# Patient Record
Sex: MALE | Race: WHITE | NOT HISPANIC OR LATINO | Employment: FULL TIME | URBAN - NONMETROPOLITAN AREA
[De-identification: names, ages, dates, MRNs, and addresses within clinical notes are randomized per-mention and may not be internally consistent; named-entity substitution may affect disease eponyms.]

---

## 2019-03-18 ENCOUNTER — OFFICE VISIT (OUTPATIENT)
Dept: ORTHOPEDIC SURGERY | Facility: CLINIC | Age: 59
End: 2019-03-18

## 2019-03-18 VITALS — WEIGHT: 140 LBS | HEIGHT: 74 IN | BODY MASS INDEX: 17.97 KG/M2

## 2019-03-18 DIAGNOSIS — S92.412A CLOSED DISPLACED FRACTURE OF PROXIMAL PHALANX OF LEFT GREAT TOE, INITIAL ENCOUNTER: Primary | ICD-10-CM

## 2019-03-18 PROBLEM — S92.401A: Status: ACTIVE | Noted: 2019-03-18

## 2019-03-18 PROCEDURE — 73630 X-RAY EXAM OF FOOT: CPT | Performed by: PHYSICIAN ASSISTANT

## 2019-03-18 PROCEDURE — 99203 OFFICE O/P NEW LOW 30 MIN: CPT | Performed by: PHYSICIAN ASSISTANT

## 2019-03-18 RX ORDER — ASPIRIN 81 MG/1
81 TABLET, CHEWABLE ORAL DAILY
COMMUNITY

## 2019-03-18 NOTE — PROGRESS NOTES
NEW/FOLLOW UP VISIT    Mac Renteria  ?  1960  ?  Chief Complaint   Patient presents with   • Left Foot - Work Related Injury, Pain      ?  HPI:  Patient presents today for follow-up on left foot pain from injury on 3/6/2019.  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  States while working, he dropped a 16' by 17' board on the distal portion of his left foot. Most of his pain is felt in the left great toe.  He waited until 3/13/2019 to be seek medical attention, at which point he decided to get care because it was not improving.  He was placed in a walking boot and has been off of work since 3/13/2019. He was referred to our office per his request.  His pain is a constant ache that is a 5/10 at this time, but increases to a 10/10 at the worst.  He is using an over-the-counter Tylenol muscle and pain which does relieve his pain but the symptoms quickly return.  His pain is increased when walking or being up on his feet.  He does have associated swelling but denies any numbness or tingling.    This patient is a new patient.  This problem is new to this examiner.    Social History     Socioeconomic History   • Marital status:      Spouse name: Not on file   • Number of children: Not on file   • Years of education: Not on file   • Highest education level: Not on file   Tobacco Use   • Smoking status: Never Smoker   • Smokeless tobacco: Never Used   Substance and Sexual Activity   • Alcohol use: Defer       Past Medical History:   Diagnosis Date   • CVA (cerebral vascular accident) (CMS/HCC)    • DVT, lower extremity (CMS/[facility])    • Skin cancer        No past surgical history on file.   No family history on file.  Allergies   Allergen Reactions   • Contrast Dye Unknown (See Comments)     unknown         Review of Systems   Constitutional: Negative.  Negative for fever.   Eyes: Negative.    Respiratory: Negative.    Cardiovascular: Negative.    Endocrine: Negative.    Musculoskeletal: Positive  for arthralgias, gait problem and joint swelling.   Skin: Negative.  Negative for rash and wound.   Allergic/Immunologic: Negative.    Neurological: Negative for numbness.   Hematological: Negative.    Psychiatric/Behavioral: Negative.            Physical Exam   Constitutional: Patient is oriented to person, place, and time. Appears well-developed and well-nourished.   HENT:   Head: Normocephalic and atraumatic.   Eyes: Conjunctivae and EOM are normal. Pupils are equal, round, and reactive to light.   Cardiovascular: Normal rate, regular rhythm, normal heart sounds and intact distal pulses.   Pulmonary/Chest: Effort normal and breath sounds normal.   Musculoskeletal:   See detailed exam below   Neurological: Alert and oriented to person, place, and time. No sensory deficit. Coordination normal.   Skin: Skin is warm and dry. Capillary refill takes less than 2 seconds. No rash noted. No erythema.   Psychiatric: Patient has a normal mood and affect. Her behavior is normal. Judgment and thought content normal.   Nursing note and vitals reviewed.    Ortho Exam:   Left foot-1st phalanx fx. The foot is swollen and tender. Lisfranc joint is stable. There is exquisite tenderness along the proximal phalanx. Appropriate amounts of swelling and bruising are noted. There is no evidence of a compartmental syndrome or instability of the midfoot. Dorsalis pedis and posterior tibial artery pulses are palpable. Common peroneal nerve function is well preserved. Dorsiflexion and plantarflexion are within normal limits although there is pain with movement due to the swelling in the left great toe.  The arches of the foot are fairly well preserved. Patient's gait is altered due to pain and difficulty walking/bearing weight on the affected area. The ankle mortise is stable.       Diagnostics:  X-Ray Report:  Left foot X-Ray  Indication: Evaluation of left foot pain  AP, Lateral views  Findings: Minimally angulated fracture of great toe  proximal phalanx.  Significantly improved in comparison to x-rays from 3/13/2019 taken at [facility], which showed more of a displaced and angulated fracture of the great toe proximal phalanx  Bony lesion: no  Soft tissues: Increased/swelling  Joint spaces: within normal limits  Hardware appropriately positioned: not applicable    Prior studies available for comparison: yes from outside clinic taken on 3/13/2019 of which were independently visualized and interpreted by myself    X-RAY was ordered and reviewed by Luis Stoddard PA-C       Assessment:  Mac was seen today for work related injury and pain.    Diagnoses and all orders for this visit:    Closed displaced fracture of proximal phalanx of left great toe, initial encounter  -     XR Foot 3+ View Left  -     HYDROcodone-acetaminophen (NORCO) 5-325 MG per tablet; Take 1 tablet by mouth Every 6 (Six) Hours As Needed for Moderate Pain  or Severe Pain .        ?  ?  Plan    · Continue with walking boot  · Weight bearing as tolerated although I would like him to elevate the foot as much as possible and to stay off of it as much as he can.  · Rest, ice, compression, and elevation (RICE) therapy  · Will discuss pain medication with Dr. Roman  · Follow up in 4 week(s)  · To remain off work until next follow-up      Luis Stoddard PA-C  03/18/2019

## 2019-03-19 RX ORDER — HYDROCODONE BITARTRATE AND ACETAMINOPHEN 5; 325 MG/1; MG/1
1 TABLET ORAL EVERY 6 HOURS PRN
Qty: 30 TABLET | Refills: 0 | Status: SHIPPED | OUTPATIENT
Start: 2019-03-19 | End: 2020-05-15 | Stop reason: SDUPTHER

## 2019-04-15 ENCOUNTER — HOSPITAL ENCOUNTER (OUTPATIENT)
Dept: NEUROLOGY | Facility: HOSPITAL | Age: 59
Discharge: HOME | End: 2019-04-15
Attending: PSYCHIATRY & NEUROLOGY

## 2019-04-15 LAB
CREAT BLD-MCNC: 1 MG/DL (ref 0.6–1.4)
GFR SERPLBLD BASED ON 1.73 SQ M-ARVRAT: >60 ML/MIN/{1.73_M2}

## 2019-04-16 ENCOUNTER — OFFICE VISIT (OUTPATIENT)
Dept: ORTHOPEDIC SURGERY | Facility: CLINIC | Age: 59
End: 2019-04-16

## 2019-04-16 VITALS — HEIGHT: 73 IN | TEMPERATURE: 97.8 F | BODY MASS INDEX: 20.67 KG/M2 | WEIGHT: 156 LBS

## 2019-04-16 DIAGNOSIS — S92.402D: Primary | ICD-10-CM

## 2019-04-16 PROCEDURE — 99024 POSTOP FOLLOW-UP VISIT: CPT | Performed by: PHYSICIAN ASSISTANT

## 2019-04-16 PROCEDURE — 73630 X-RAY EXAM OF FOOT: CPT | Performed by: PHYSICIAN ASSISTANT

## 2019-04-16 NOTE — PROGRESS NOTES
FRACTURE GLOBAL     NAME: Mac Renteria  ?  : 1960  ?  MRN: [id number]    Chief Complaint   Patient presents with   • Left Foot - Follow-up     ?  Date of fracture: 2019    HPI:   Patient returns today for 4 week(s) follow up of proximal phalanx of left great toe fracture that occurred while at work. Patient reports doing well with no unusual complaints. Appears to be progressing appropriately. He denies any pain but reports he cannot bend it well at night time due to the swelling. He has been wearing the boot and weight bearing as tolerated. He is currently off work due to the injury.      Review of Systems:      Ortho Exam:   Left foot-1st phalanx fx. The left great toe is minimally swollen and tender. Lisfranc joint is stable.  Bruising noted on initial exam has resolved. There is no evidence of a compartmental syndrome or instability of the midfoot. Dorsalis pedis and posterior tibial artery pulses are palpable. Common peroneal nerve function is well preserved. Dorsiflexion and plantarflexion are within normal limits although there is slight discomfort with movement due to the swelling in the left great toe.  The arches of the foot are fairly well preserved. Patient's gait is altered due to pain and difficulty walking/bearing weight on the affected area. The ankle mortise is stable.     Diagnostic Studies:  X-Ray Report:  Left foot X-Ray  Indication: Evaluation of healing of proximal phalanx of left great toe fracture  AP, Lateral views  Findings: Acceptable alignment of fracture of proximal phalanx of left great toe.   Bony lesion: no  Soft tissues: within normal limits  Joint spaces: within normal limits  Hardware appropriately positioned: not applicable    Prior studies available for comparison: yes from 3/18/2019    X-RAY was ordered and reviewed by Luis Stoddard PA-C        Assessment:  Mac was seen today for follow-up.    Diagnoses and all orders for this visit:    Closed displaced  fracture of phalanx of left great toe with routine healing, subsequent encounter  -     XR Foot 3+ View Left          Plan   Continue to wear cam walking boot for 1.5-2 more weeks at which point he can discontinue the boot and weight-bear as tolerated  Continue ice as needed  Stretching and strengthening exercises  Tylenol 1000mg by mouth every 4-6 hours as needed for pain   Fall precautions  Follow up in 4 week(s) with repeat x-rays  He is to remain off work until reevaluation at next follow-up      Luis Stoddard PA-C  5/14/2019

## 2019-05-14 ENCOUNTER — OFFICE VISIT (OUTPATIENT)
Dept: ORTHOPEDIC SURGERY | Facility: CLINIC | Age: 59
End: 2019-05-14

## 2019-05-14 VITALS — BODY MASS INDEX: 21.2 KG/M2 | HEIGHT: 73 IN | TEMPERATURE: 97.7 F | WEIGHT: 160 LBS

## 2019-05-14 DIAGNOSIS — S92.402D DISPLACED UNSPECIFIED FRACTURE OF LEFT GREAT TOE, SUBSEQUENT ENCOUNTER FOR FRACTURE WITH ROUTINE HEALING: Primary | ICD-10-CM

## 2019-05-14 PROCEDURE — 73660 X-RAY EXAM OF TOE(S): CPT | Performed by: PHYSICIAN ASSISTANT

## 2019-05-14 PROCEDURE — 99024 POSTOP FOLLOW-UP VISIT: CPT | Performed by: PHYSICIAN ASSISTANT

## 2019-05-14 NOTE — PROGRESS NOTES
OTHER POST OP GLOBAL     NAME: Mac Renteria  ?  : 1960  ?  MRN: [id number]    Chief Complaint   Patient presents with   • Left Foot - Follow-up     ?  Date of fracture: 3/6/2019  Date of initial visit: 3/18/2019    HPI:   Patient returns today for 10 week(s) follow up of left Proximal phalanx of left great toe fracture. Patient reports doing well with no unusual complaints. Appears to be progressing appropriately.  He reports he does not have any pain in his mobility is steadily increasing with time.  He is currently increasing his ability to weight-bear.  He would like to be able to return to work next week.  He has not yet started wearing his boots to see how his foot will do once he is back to work.  He has been able to walk around barefoot at home without any pain.      Review of Systems:      Ortho Exam:   Left foot-1st phalanx fx. The left great toe is minimally swollen and tender. Lisfranc joint is stable.  Bruising noted on initial exam has resolved. There is no evidence of a compartmental syndrome or instability of the midfoot. Dorsalis pedis and posterior tibial artery pulses are palpable. Common peroneal nerve function is well preserved. Dorsiflexion and plantarflexion are within normal limits although there is slight discomfort with movement due to the swelling in the left great toe.  The arches of the foot are fairly well preserved. Patient's gait is altered due to pain and difficulty walking/bearing weight on the affected area. The ankle mortise is stable.          Diagnostic Studies:  X-Ray Report:  Left foot X-Ray  Indication: Evaluation of healing of proximal phalanx of left great toe fracture  AP, Lateral views  Findings: Acceptable alignment of fracture of proximal phalanx of left great toe  Bony lesion: no  Soft tissues: within normal limits  Joint spaces: within normal limits  Hardware appropriately positioned: not applicable    Prior studies available for comparison: yes     X-RAY was  ordered and reviewed by Luis Stoddard PA-C        Assessment:  Mac was seen today for follow-up.    Diagnoses and all orders for this visit:    Displaced unspecified fracture of left great toe, subsequent encounter for fracture with routine healing  -     XR Toe 2+ View Left    Other orders  -     Cancel: XR Toe 2+ View Right          Plan   Incision care  Continue ice as needed  Advised to take the next week to completely discontinue the tall cam walking boot and begin to wear work boots to see how his pain is  Stretching and strengthening exercises  Alternate Ibuprofen and Tylenol as needed  Fall precautions  Follow up in 4 weeks   Patient was advised to call if any problems between now and return to work date but at this time he is able to return to work without restrictions on 5/22/2019    Luis Stoddard PA-C  05/14/2019

## 2019-06-11 ENCOUNTER — OFFICE VISIT (OUTPATIENT)
Dept: ORTHOPEDIC SURGERY | Facility: CLINIC | Age: 59
End: 2019-06-11

## 2019-06-11 DIAGNOSIS — S92.402D: Primary | ICD-10-CM

## 2019-06-11 PROCEDURE — 73660 X-RAY EXAM OF TOE(S): CPT | Performed by: PHYSICIAN ASSISTANT

## 2019-06-11 PROCEDURE — 99213 OFFICE O/P EST LOW 20 MIN: CPT | Performed by: PHYSICIAN ASSISTANT

## 2019-06-11 NOTE — PROGRESS NOTES
FOLLOW UP VISIT    Patient: Mac Renteria  ?  YOB: 1960    MRN: [id number]  ?  Chief Complaint   Patient presents with   • Left Foot - Follow-up     Date of fracture: 3/6/2019  Date of initial visit: 3/18/2019  ?  HPI:   Patient returns today for 13-week follow-up of left proximal phalanx of left great toe fracture.  Patient reports he is returned back to work and is doing well with no complaints.  In the first couple of days free starting work he did have some pain in the toe but that quickly resolved.  He is now able to wear his work boots without any problem.    This patient is an established patient.  This problem is not new to this examiner.      Allergies:   Allergies   Allergen Reactions   • Contrast Dye Unknown (See Comments)     unknown       Medications:   Home Medications:  Current Outpatient Medications on File Prior to Visit   Medication Sig   • aspirin 81 MG chewable tablet Chew 81 mg Daily.   • HYDROcodone-acetaminophen (NORCO) 5-325 MG per tablet Take 1 tablet by mouth Every 6 (Six) Hours As Needed for Moderate Pain  or Severe Pain .     No current facility-administered medications on file prior to visit.      Current Medications:  Scheduled Meds:  PRN Meds:.    I have reviewed the patient's medical history in detail and updated the computerized patient record.  Review and summarization of old records include:    Past Medical History:   Diagnosis Date   • CVA (cerebral vascular accident) (CMS/HCC)    • DVT, lower extremity (CMS/HCC)    • Skin cancer      No past surgical history on file.  Social History     Occupational History   • Not on file   Tobacco Use   • Smoking status: Never Smoker   • Smokeless tobacco: Never Used   Substance and Sexual Activity   • Alcohol use: Defer   • Drug use: Not on file   • Sexual activity: Not on file      Social History     Social History Narrative   • Not on file     No family history on file.      Review of Systems   Constitutional: Negative.   "Negative for fever.   Eyes: Negative.    Respiratory: Negative.    Cardiovascular: Negative.    Endocrine: Negative.    Musculoskeletal: Negative for arthralgias, gait problem and joint swelling.   Skin: Negative.  Negative for rash and wound.   Allergic/Immunologic: Negative.    Neurological: Negative for numbness.   Hematological: Negative.    Psychiatric/Behavioral: Negative.           Wt Readings from Last 3 Encounters:   05/14/19 72.6 kg (160 lb)   04/16/19 70.8 kg (156 lb)   03/18/19 63.5 kg (140 lb)     Ht Readings from Last 3 Encounters:   05/14/19 186.1 cm (73.25\")   04/16/19 185.4 cm (73\")   03/18/19 188 cm (74\")     There is no height or weight on file to calculate BMI.  No height and weight on file for this encounter.  There were no vitals filed for this visit.      Physical Exam   Constitutional: Patient is oriented to person, place, and time. Appears well-developed and well-nourished.   HENT:   Head: Normocephalic and atraumatic.   Eyes: Conjunctivae and EOM are normal. Pupils are equal, round, and reactive to light.   Cardiovascular: Normal rate, regular rhythm, normal heart sounds and intact distal pulses.   Pulmonary/Chest: Effort normal and breath sounds normal.   Musculoskeletal:   See detailed exam below   Neurological: Alert and oriented to person, place, and time. No sensory deficit. Coordination normal.   Skin: Skin is warm and dry. Capillary refill takes less than 2 seconds. No rash noted. No erythema.   Psychiatric: Patient has a normal mood and affect. Her behavior is normal. Judgment and thought content normal.   Nursing note and vitals reviewed.    Ortho Exam:   Left foot-1st phalanx fx. There is no swelling of the left great toe. Lisfranc joint is stable.  Bruising noted on initial exam has resolved. There is no evidence of a compartmental syndrome or instability of the midfoot. Dorsalis pedis and posterior tibial artery pulses are palpable. Common peroneal nerve function is well " preserved. Dorsiflexion and plantarflexion are within normal limits. The arches of the foot are fairly well preserved. Patient's gait is normal. The ankle mortise is stable.       Diagnostics:  X-Ray Report:  Left great toe x-Ray  Indication: Evaluation of healing of proximal phalanx of left great toe fracture  AP, Lateral views  Findings: Acceptable alignment and healing of fracture of proximal phalanx  Bony lesion: no  Soft tissues: within normal limits  Joint spaces: within normal limits  Hardware appropriately positioned: not applicable    Prior studies available for comparison: yes, most recent from 5/14/2019    X-RAY was ordered and reviewed by Luis Stoddard PA-C       Assessment:  Mac was seen today for follow-up.    Diagnoses and all orders for this visit:    Closed displaced fracture of phalanx of left great toe with routine healing, subsequent encounter  -     XR Toe 2+ View Left    ?    Plan    · Released to return to work without any restrictions  · Rest, ice, compression, and elevation (RICE) therapy  · Stretching and strengthening exercises  · OTC Alternate Ibuprofen and Tylenol as needed  · Follow up as needed    Luis Stoddard PA-C  06/11/2019

## 2019-06-27 ENCOUNTER — HOSPITAL ENCOUNTER (OUTPATIENT)
Dept: OTHER | Facility: HOSPITAL | Age: 59
Discharge: HOME | End: 2019-06-27
Attending: INTERNAL MEDICINE

## 2019-06-27 LAB — ERYTHROCYTE [SEDIMENTATION RATE] IN BLOOD: 2 MM/H (ref 0–20)

## 2019-06-30 LAB
C3 SERPL-MCNC: 103 MG/DL (ref 88–201)
C4 SERPL-MCNC: 38 MG/DL (ref 10–40)
CONV ANA IGG BY ELISA: NORMAL
RHEUMATOID FACT SERPL-ACNC: <10 IU/ML (ref 0–14)

## 2020-05-15 ENCOUNTER — OFFICE VISIT (OUTPATIENT)
Dept: ORTHOPEDIC SURGERY | Facility: CLINIC | Age: 60
End: 2020-05-15

## 2020-05-15 VITALS — TEMPERATURE: 98.7 F

## 2020-05-15 DIAGNOSIS — S82.025A CLOSED NONDISPLACED LONGITUDINAL FRACTURE OF LEFT PATELLA, INITIAL ENCOUNTER: Primary | ICD-10-CM

## 2020-05-15 DIAGNOSIS — S89.92XA INJURY OF LEFT KNEE, INITIAL ENCOUNTER: ICD-10-CM

## 2020-05-15 DIAGNOSIS — S92.412A CLOSED DISPLACED FRACTURE OF PROXIMAL PHALANX OF LEFT GREAT TOE, INITIAL ENCOUNTER: ICD-10-CM

## 2020-05-15 PROCEDURE — 99214 OFFICE O/P EST MOD 30 MIN: CPT | Performed by: PHYSICIAN ASSISTANT

## 2020-05-15 PROCEDURE — 27520 TREAT KNEECAP FRACTURE: CPT | Performed by: PHYSICIAN ASSISTANT

## 2020-05-15 PROCEDURE — 73562 X-RAY EXAM OF KNEE 3: CPT | Performed by: PHYSICIAN ASSISTANT

## 2020-05-16 RX ORDER — HYDROCODONE BITARTRATE AND ACETAMINOPHEN 5; 325 MG/1; MG/1
1 TABLET ORAL EVERY 6 HOURS PRN
Qty: 30 TABLET | Refills: 0 | Status: SHIPPED | OUTPATIENT
Start: 2020-05-16 | End: 2020-05-18 | Stop reason: SDUPTHER

## 2020-05-18 DIAGNOSIS — S92.412A CLOSED DISPLACED FRACTURE OF PROXIMAL PHALANX OF LEFT GREAT TOE, INITIAL ENCOUNTER: ICD-10-CM

## 2020-05-18 RX ORDER — HYDROCODONE BITARTRATE AND ACETAMINOPHEN 5; 325 MG/1; MG/1
1 TABLET ORAL EVERY 6 HOURS PRN
Qty: 30 TABLET | Refills: 0 | Status: SHIPPED | OUTPATIENT
Start: 2020-05-18 | End: 2020-08-07

## 2020-05-18 NOTE — TELEPHONE ENCOUNTER
I TRIED TO CALL THE PT AND LET HIM KNOW HIS RX WAS SENT TO THE PHARMACY BUT SAYS ITS NOT A WORKING NUMBER

## 2020-05-20 PROBLEM — S82.025A CLOSED NONDISPLACED LONGITUDINAL FRACTURE OF LEFT PATELLA: Status: ACTIVE | Noted: 2020-05-20

## 2020-05-20 PROBLEM — S89.92XA INJURY OF LEFT KNEE: Status: ACTIVE | Noted: 2020-05-20

## 2020-05-20 NOTE — PROGRESS NOTES
NEW VISIT    Patient: Mac Renteria  ?  YOB: 1960    MRN: [id number]  ?  Chief Complaint   Patient presents with   • Left Knee - Pain, Injury      ?  HPI:   Mac Renteria is a 60 y.o. male who presents with complaint of left knee pain secondary to a fall at work.  He works at [employer] in [place] and reports he fell directly onto the left knee.  The following day the pain was significantly worse.  He states the pain is worse at night and he is using a walker for ambulation assistance.  He does have a work comp  but is not accompanied by a  today.  Pain Location: left knee  Radiation: none  Quality: aching  Intensity/Severity: moderate  Duration: since 4/22/20  Onset quality: sudden  Timing: intermittent  Aggravating Factors: walking  Alleviating Factors: rest  Previous Episodes: none mentioned  Associated Symptoms: pain, swelling, clicking/popping and decreased range of motion  ADLs Affected: grooming/hygiene/toileting/personal care, dressing, ambulating, work related activities, recreational activities/sports  Previous Treatment: He was [facility] at [facility] in the emergency room followed by work comp provider.  X-rays and MRI have been obtained.    This patient is an established patient.  This problem is new to this examiner.      Allergies:   Allergies   Allergen Reactions   • Contrast Dye Unknown (See Comments)     unknown       Medications:   Home Medications:  Current Outpatient Medications on File Prior to Visit   Medication Sig   • aspirin 81 MG chewable tablet Chew 81 mg Daily.     No current facility-administered medications on file prior to visit.      Current Medications:  Scheduled Meds:  PRN Meds:.    I have reviewed the patient's medical history in detail and updated the computerized patient record.  Review and summarization of old records include:    Past Medical History:   Diagnosis Date   • CVA (cerebral vascular accident) (CMS/[facility])    • DVT,  "lower extremity (CMS/HCC)    • Skin cancer      No past surgical history on file.  Social History     Occupational History   • Not on file   Tobacco Use   • Smoking status: Never Smoker   • Smokeless tobacco: Never Used   Substance and Sexual Activity   • Alcohol use: Defer   • Drug use: Not on file   • Sexual activity: Not on file     No family history on file.      Review of Systems   Constitutional: Negative.  Negative for fever.   Eyes: Negative.    Respiratory: Negative.    Cardiovascular: Negative.    Endocrine: Negative.    Musculoskeletal: Positive for arthralgias, gait problem and joint swelling.   Skin: Negative.  Negative for rash and wound.   Allergic/Immunologic: Negative.    Neurological: Negative for numbness.   Hematological: Negative.    Psychiatric/Behavioral: Negative.           Wt Readings from Last 3 Encounters:   05/14/19 72.6 kg (160 lb)   04/16/19 70.8 kg (156 lb)   03/18/19 63.5 kg (140 lb)     Ht Readings from Last 3 Encounters:   05/14/19 186.1 cm (73.25\")   04/16/19 185.4 cm (73\")   03/18/19 188 cm (74\")     There is no height or weight on file to calculate BMI.  No height and weight on file for this encounter.  Vitals:    05/15/20 0938   Temp: 98.7 °F (37.1 °C)         Physical Exam  Constitutional: Patient is oriented to person, place, and time. Appears well-developed and well-nourished.   HENT:   Head: Normocephalic and atraumatic.   Eyes: Conjunctivae and EOM are normal. Pupils are equal, round, and reactive to light.   Cardiovascular: Normal rate and intact distal pulses.   Pulmonary/Chest: Effort normal and breath sounds normal.   Musculoskeletal:   See detailed exam below   Neurological: Alert and oriented to person, place, and time. No sensory deficit. Coordination normal.   Skin: Skin is warm and dry. Capillary refill takes less than 2 seconds. No rash noted. No erythema.   Psychiatric: Patient has a normal mood and affect. His behavior is normal. Judgment and thought content " normal.   Nursing note and vitals reviewed.        Ortho Exam:   Left knee-prepatella fx:   Positive for swelling and bruising over the anterior aspect of the patella.   Height of patella is appropriate.    Positive for a moderate extensor lag.   He is unable to perform a straight leg raise exam.   There is tenderness both over the medial and lateral retinaculum of the patella, with most tenderness being on the lateral edge of the patella.   Calf is soft and nontender. Homans sign is negative.   Tenderness over the patella is consistent with fracture of the patella.      Diagnostics:  XR - 1 Result   I have personally reviewed   Results for orders placed in visit on 05/15/20   XR KNEE 3 VW LEFT 5/15/2020   and my interpretation of the image is as follows:   Findings: Closed nondisplaced longitudinal fracture of the lateral edge of the patella  Bony lesion: no  Soft tissues: increased  Joint spaces: subnormal  Hardware appropriately positioned: not applicable  Prior studies available for comparison: yes   X-RAY was ordered and reviewed by Luis Stoddard PA-C    This patient's x-ray report was graded according to the Kellgren and Waldo classification.  This took into account the joint space narrowing, osteophyte formation, sclerosis of the distal femur/proximal tibia along with deformity of those bones.  The findings were indicative of K L grade early 3.         Reviewed MRI report of left knee without contrast, performed at [facility] 5/4/2020, summary of impression below:  · Fracture of the lateral aspect of the patella  · Small joint effusion  · Inflammatory changes of Hoffa's fat pad bilaterally  · Tendons and ligaments are intact      Assessment:  Mac was seen today for pain and injury.    Diagnoses and all orders for this visit:    Closed nondisplaced longitudinal fracture of left patella, initial encounter    Injury of left knee, initial encounter  -     XR Knee 3 View Left    ?    Plan     · Discussed x-ray findings and appropriate treatment options  · Closed treatment of fracture and or dislocation.  • Discussion of orthopaedic goals and activities and patient and/or guardian expressed appreciation.  • Ice, heat, and/or modalities as beneficial  • Elevate leg for residual swelling  • Cast, splint or brace care and assistive device usage instructions given  • Reduced physical activity as appropriate and avoid offending activity  • Weight bearing parameters reviewed  • Reinjury Precautions  • Patient is encouraged to call or return for any issues or concerns.  • Hinged knee brace provided in office today  • Follow up in 4 weeks  • He requests something for pain control, which will be sent to Dr. Romna and into his pharmacy  · He is to remain off work until next follow-up    Date of encounter: 05/15/2020   Luis Stoddard PA-C      Electronically signed by Luis Stoddard PA-C, 05/20/20, 8:27 AM.

## 2020-06-12 ENCOUNTER — HOSPITAL ENCOUNTER (OUTPATIENT)
Dept: OTHER | Facility: HOSPITAL | Age: 60
Discharge: HOME | End: 2020-06-12
Attending: PHYSICIAN ASSISTANT

## 2020-06-12 ENCOUNTER — OFFICE VISIT (OUTPATIENT)
Dept: ORTHOPEDIC SURGERY | Facility: CLINIC | Age: 60
End: 2020-06-12

## 2020-06-12 VITALS — HEIGHT: 74 IN | TEMPERATURE: 97.9 F | BODY MASS INDEX: 20.53 KG/M2 | WEIGHT: 160 LBS

## 2020-06-12 DIAGNOSIS — S82.025G CLOSED NONDISPLACED LONGITUDINAL FRACTURE OF LEFT PATELLA WITH DELAYED HEALING, SUBSEQUENT ENCOUNTER: Primary | ICD-10-CM

## 2020-06-12 PROCEDURE — 99024 POSTOP FOLLOW-UP VISIT: CPT | Performed by: PHYSICIAN ASSISTANT

## 2020-06-12 RX ORDER — OMEPRAZOLE 40 MG/1
CAPSULE, DELAYED RELEASE ORAL
COMMUNITY
Start: 2020-03-17 | End: 2020-08-07

## 2020-06-15 ENCOUNTER — TELEPHONE (OUTPATIENT)
Dept: ORTHOPEDIC SURGERY | Facility: CLINIC | Age: 60
End: 2020-06-15

## 2020-06-30 NOTE — PROGRESS NOTES
FRACTURE CARE VISIT (Global)      NAME: Mac Renteria           : 1960    MRN: [id number]      Chief Complaint   Patient presents with   • Left Knee - Pain, Follow-up     Date of Fracture: 2020  ?     HPI  Mac Renteria presents for 4 week follow up s/p fracture of left lateral edge of patella.  He is actually 7 weeks out from date of injury. He reports some improvement in his pain but states it is not completely resolved.  He has remained off work since prior visit with me.          Physical Exam  General: alert, appears stated age, cooperative and no distress  Physical Exam:  Signs of infection: [x] no                  [] yes   Swelling: [x] no                  [] yes   Skin wound: [] healing well   [] healed well   [x] skin intact    Motor exam intact: [] no                  [x] yes   Neurovascular exam intact: [] no                  [x] yes   Signs of compartment syndrome: [x] no                  [] yes   Signs of DVT: [x] no                  [] yes   Ecchymosis: [x] no                  [] yes     Musculoskeletal:   Left knee:  Positive for mild swelling over the anterior aspect of the patella.   Height of patella is appropriate.    Positive for a mild extensor lag.   He is unable to perform a straight leg raise exam.   There is tenderness both over the medial and lateral retinaculum of the patella, with most tenderness being on the lateral edge of the patella.   Calf is soft and nontender. Homans sign is negative.   Tenderness over the patella is consistent with healing fracture of the patella.      Diagnostic Data:  Left knee xrays 2views with sunrise view were ordered by Luis Stoddard PA-C and performed at [facility]. These images were independently viewed and interpreted by myself, my impression as follows:    Findings: Closed nondisplaced longitudinal fracture of the lateral edge of the patella that shows slow healing  Bony lesion: no  Soft tissues: increased  Joint  spaces: subnormal  Hardware appropriately positioned: not applicable  Prior studies available for comparison: yes   X-RAY was ordered and reviewed by Luis Stoddard PA-C         Assessment/Plan   Assessment:    1. Closed nondisplaced longitudinal fracture of left patella with delayed healing, subsequent encounter          Plan:   Orders Placed This Encounter   Procedures   • XR Knee 1 or 2 View Left   • XR Knee 1 or 2 View Left      • Discussion of orthopaedic goals and activities and patient and/or guardian expressed appreciation.  • Ice, heat, and/or modalities as beneficial  • Elevate leg for residual swelling  • Cast, splint or brace care and assistive device usage instructions given  • Reduced physical activity as appropriate and avoid offending activity  • Weight bearing parameters reviewed  • Reinjury Precautions  • Patient is encouraged to call or return for any issues or concerns.  •    • He should continue with knee brace and remain off work until next follow-up  • Follow up in 4 weeks         Luis Stoddard PA-C  06/12/2020     Electronically signed by Luis Stoddard PA-C, 06/30/20, 7:45 PM.    EMR Dragon/Transcription disclaimer:  Much of this encounter note is an electronic transcription/translation of spoken language to printed text. The electronic translation of spoken language may permit erroneous, or at times, nonsensical words or phrases to be inadvertently transcribed; Although I have reviewed the note for such errors, some may still exist.

## 2020-07-10 ENCOUNTER — HOSPITAL ENCOUNTER (OUTPATIENT)
Dept: OTHER | Facility: HOSPITAL | Age: 60
Discharge: HOME | End: 2020-07-10
Attending: PHYSICIAN ASSISTANT

## 2020-07-10 ENCOUNTER — OFFICE VISIT (OUTPATIENT)
Dept: ORTHOPEDIC SURGERY | Facility: CLINIC | Age: 60
End: 2020-07-10

## 2020-07-10 VITALS — BODY MASS INDEX: 21.6 KG/M2 | HEIGHT: 73 IN | WEIGHT: 163 LBS | TEMPERATURE: 98.2 F

## 2020-07-10 DIAGNOSIS — S82.025D CLOSED NONDISPLACED LONGITUDINAL FRACTURE OF LEFT PATELLA WITH ROUTINE HEALING, SUBSEQUENT ENCOUNTER: Primary | ICD-10-CM

## 2020-07-10 PROCEDURE — 99024 POSTOP FOLLOW-UP VISIT: CPT | Performed by: PHYSICIAN ASSISTANT

## 2020-07-23 DIAGNOSIS — S82.025D CLOSED NONDISPLACED LONGITUDINAL FRACTURE OF LEFT PATELLA WITH ROUTINE HEALING, SUBSEQUENT ENCOUNTER: Primary | ICD-10-CM

## 2020-07-23 NOTE — PROGRESS NOTES
FRACTURE CARE VISIT (Global)      NAME: Mac Renteria           : 1960    MRN: [id number]      Chief Complaint   Patient presents with   • Left Knee - Follow-up, Pain     Date of Fracture: 2020  ?     HPI  Mac Renteria presents for 8 week follow up s/p fracture of left lateral edge of patella.  He is actually 11 weeks out from date of injury. He reports significant improvement in his pain. He has been wearing his knee brace but at today's visit we realized he is wearing the brace backwards. Once removed and placed on correctly, his pain improved even more. He has remained off work since prior visit with me but the goal is to be able to return after next follow up.        Physical Exam  General: alert, appears stated age, cooperative and no distress  Physical Exam:  Signs of infection: [x] no                  [] yes   Swelling: [x] no                  [] yes   Skin wound: [] healing well   [] healed well   [x] skin intact    Motor exam intact: [] no                  [x] yes   Neurovascular exam intact: [] no                  [x] yes   Signs of compartment syndrome: [x] no                  [] yes   Signs of DVT: [x] no                  [] yes   Ecchymosis: [x] no                  [] yes     Musculoskeletal:   Left knee:  · Positive for minimal swelling over the anterior aspect of the patella.   · Height of patella is appropriate.    · Negative for a extensor lag.   · He is now able to perform a straight leg raise exam.   · There is still tenderness both over the medial and lateral retinaculum of the patella, with most tenderness being on the lateral edge of the patella.   · Calf is soft and nontender. Homans sign is negative.   · Tenderness over the patella is consistent with healing fracture of the patella.      Diagnostic Data:  Left knee xrays 2views with sunrise view were ordered by Luis Stoddard PA-C and performed at [facility]. These images were independently viewed and  interpreted by myself, my impression as follows:    Findings: Callus formation is noted of closed nondisplaced longitudinal fracture of the lateral edge of the patella Bony lesion: no  Soft tissues: within normal limits  Joint spaces: subnormal  Hardware appropriately positioned: not applicable  Prior studies available for comparison: yes   X-RAY was ordered and reviewed by Luis Stoddard PA-C         Assessment/Plan   Assessment:    1. Closed nondisplaced longitudinal fracture of left patella with routine healing, subsequent encounter          Plan:   Orders Placed This Encounter   Procedures   • SCANNED - IMAGING   • XR Knee 1 or 2 View Left   • SCANNED - IMAGING      • Discussion of orthopaedic goals and activities and patient and/or guardian expressed appreciation.  • Ice, heat, and/or modalities as beneficial  • Elevate leg for residual swelling  • Cast, splint or brace care and assistive device usage instructions given  • Reduced physical activity as appropriate and avoid offending activity  • Weight bearing parameters reviewed  • Reinjury Precautions  • Patient is encouraged to call or return for any issues or concerns.  • He should continue with knee brace and remain off work until next follow-up  • Follow up in 4 weeks         Luis Stoddard PA-C  7/10/2020     Electronically signed by Luis Stoddard PA-C, 07/23/20, 10:37 AM.     EMR Dragon/Transcription disclaimer:  Much of this encounter note is an electronic transcription/translation of spoken language to printed text. The electronic translation of spoken language may permit erroneous, or at times, nonsensical words or phrases to be inadvertently transcribed; Although I have reviewed the note for such errors, some may still exist.

## 2020-08-07 ENCOUNTER — HOSPITAL ENCOUNTER (OUTPATIENT)
Dept: OTHER | Facility: HOSPITAL | Age: 60
Discharge: HOME | End: 2020-08-07
Attending: PHYSICIAN ASSISTANT

## 2020-08-07 ENCOUNTER — OFFICE VISIT (OUTPATIENT)
Dept: ORTHOPEDIC SURGERY | Facility: CLINIC | Age: 60
End: 2020-08-07

## 2020-08-07 VITALS — WEIGHT: 155 LBS | TEMPERATURE: 97.1 F | BODY MASS INDEX: 20.99 KG/M2 | HEIGHT: 72 IN

## 2020-08-07 DIAGNOSIS — S82.025D CLOSED NONDISPLACED LONGITUDINAL FRACTURE OF LEFT PATELLA WITH ROUTINE HEALING, SUBSEQUENT ENCOUNTER: Primary | ICD-10-CM

## 2020-08-07 PROCEDURE — 99024 POSTOP FOLLOW-UP VISIT: CPT | Performed by: NURSE PRACTITIONER

## 2020-08-07 NOTE — PATIENT INSTRUCTIONS
Patellar Fracture, Adult    A patellar fracture is a break in the kneecap (patella). The patella is located on the front of the knee. A patellar fracture may make it difficult to walk.  What are the causes?  This condition may be caused by:  · A fall or a hard, direct hit (blow) to the knee.  · A very hard and strong bending of the knee.  What increases the risk?  The following factors make you more likely to experience a patellar fracture:  · Playing contact sports or motor sports, especially sports that involve a lot of jumping.  · Having bone abnormalities or diseases of the bone, such as osteoporosis or a bone tumor.  · Having poor strength and flexibility.  · Having metabolism disorders, hormone problems, or nutrition deficiencies and disorders, such as anorexia or bulimia.  What are the signs or symptoms?  Symptoms of this condition include:  · Tender and swollen knee.  · Pain when moving the knee, especially when straightening it.  · Difficulty walking or using the knee to support body weight (bearing weight).  · Misshapen knee, as if a bone is out of place.  How is this diagnosed?  This condition is diagnosed based on:  · Your symptoms and medical history.  · A physical exam.  · X-rays.  How is this treated?  Treatment for this condition depends on the type of fracture that you have:  · If your patella is still in the right position after the fracture and you can still straighten your leg, you may need to wear a splint or cast for 4-6 weeks.  · If your patella is broken into multiple pieces but you are able to straighten your leg, you can usually be treated with a splint or cast for 4-6 weeks. In some cases, the patella may need to be removed before the cast is applied.  · If you cannot straighten your leg after a patellar fracture, you will need to have surgery to hold the patella together until it heals. After surgery, a splint or cast will be applied for 4-6 weeks.  · You may be prescribed medicine to  help relieve pain or prevent infection.  Follow these instructions at home:  If you have a splint:  · Wear the splint as told by your health care provider. Remove it only as told by your health care provider.  · Loosen the splint if your toes tingle, become numb, or turn cold and blue.  · Keep the splint clean.  · If the splint is not waterproof:  ? Do not let it get wet.  ? Cover it with a watertight covering when you take a bath or a shower.  If you have a cast:  · Do not stick anything inside the cast to scratch your skin. Doing that increases your risk of infection.  · Check the skin around the cast every day. Tell your health care provider about any concerns.  · You may put lotion on dry skin around the edges of the cast. Do not put lotion on the skin underneath the cast.  · Keep the cast clean.  · If the cast is not waterproof:  ? Do not let it get wet.  ? Cover it with a watertight covering when you take a bath or a shower.  Managing pain, stiffness, and swelling    · If directed, put ice on the injured area:  ? If you have a removable splint, remove it as told by your health care provider.  ? Put ice in a plastic bag.  ? Place a towel between your skin and the bag or between your cast and the bag.  ? Leave the ice on for 20 minutes, 2-3 times a day.  · Move your toes often to avoid stiffness and to lessen swelling.  · Raise (elevate) the injured area above the level of your heart while you are sitting or lying down.  Medicines  · Take over-the-counter and prescription medicines only as told by your health care provider.  · If you were prescribed an antibiotic medicine, use it as told by your health care provider. Do not stop taking the antibiotic even if you start to feel better.  Activity  · Return to your normal activities as told by your health care provider. Ask your health care provider what activities are safe for you.  · Do exercises as told by your health care provider.  · Ask your health care  provider when it is safe to drive if you have a splint or cast on your leg.  · Do not drive or use heavy machinery while taking prescription pain medicine.  General instructions  · Do not use the injured limb to support your body weight until your health care provider says that you can. Use crutches as told by your health care provider.  · Do not use any products that contain nicotine or tobacco, such as cigarettes and e-cigarettes. These can delay bone healing. If you need help quitting, ask your health care provider.  · Keep all follow-up visits as told by your health care provider. This is important.  Contact a health care provider if:  · You have symptoms that get worse or do not get better after 2 weeks of treatment.  · You have severe, persistent pain.  Get help right away if:  · You have redness, swelling, or increasing pain in your knee.  · You have a fever.  · You have blue or gray skin below the fracture site or in the toes.  · You have numbness or loss of feeling below the fracture site.  This information is not intended to replace advice given to you by your health care provider. Make sure you discuss any questions you have with your health care provider.  Document Released: 09/15/2004 Document Revised: 11/30/2018 Document Reviewed: 09/29/2017  Elsevier Patient Education © 2020 [employer]

## 2020-08-07 NOTE — PROGRESS NOTES
Patient Name: Mac Renteria   YOB: 1960  Referring Primary Care Physician: Anali De Guzman, RAFITA  BMI: Body mass index is 21.02 kg/m².    Chief Complaint:    Chief Complaint   Patient presents with   • Left Knee - Follow-up, Fracture        HPI: New pt to me following up on left patella fracture secondary to fall at work and was initially seen in the clinic 5-15-20 and has been off work. Pt is better and anxious to return to work with no limitations. Masks were worn by everyone in exam room during the visit.     Mac Renteria is a 60 y.o. male who presents today for evaluation of   Chief Complaint   Patient presents with   • Left Knee - Follow-up, Fracture   .     This problem is new to this examiner.     Subjective   Medications:   Home Medications:  Current Outpatient Medications on File Prior to Visit   Medication Sig   • aspirin 81 MG chewable tablet Chew 81 mg Daily.     No current facility-administered medications on file prior to visit.      Current Medications:  Scheduled Meds:  Continuous Infusions:  No current facility-administered medications for this visit.   PRN Meds:.    I have reviewed the patient's medical history in detail and updated the computerized patient record.  Review and summarization of old records includes:    Past Medical History:   Diagnosis Date   • CVA (cerebral vascular accident) (CMS/HCC)    • DVT, lower extremity (CMS/HCC)    • Skin cancer       No past surgical history on file.     Social History     Occupational History   • Not on file   Tobacco Use   • Smoking status: Never Smoker   • Smokeless tobacco: Never Used   Substance and Sexual Activity   • Alcohol use: Defer   • Drug use: Not on file   • Sexual activity: Not on file      Social History     Social History Narrative   • Not on file      History reviewed. No pertinent family history.    ROS: 14 point review of systems was performed and all other systems were reviewed and are negative except for documented  "findings in HPI and today's encounter.     Allergies:   Allergies   Allergen Reactions   • Contrast Dye Itching     unknown     Constitutional:  Denies fever, shaking or chills   Eyes:  Denies change in visual acuity   HENT:  Denies nasal congestion or sore throat   Respiratory:  Denies cough or shortness of breath   Cardiovascular:  Denies chest pain or severe LE edema   GI:  Denies abdominal pain, nausea, vomiting, bloody stools or diarrhea   Musculoskeletal:  Numbness, tingling, pain, or loss of motor function only as noted above in history of present illness.  : Denies painful urination or hematuria  Integument:  Denies rash, lesion or ulceration   Neurologic:  Denies headache or focal weakness  Endocrine:  Denies lymphadenopathy  Psych:  Denies confusion or change in mental status   Hem:  Denies active bleeding    OBJECTIVE:  Physical Exam:   Temp 97.1 °F (36.2 °C)   Ht 182.9 cm (72\")   Wt 70.3 kg (155 lb)   BMI 21.02 kg/m²     General Appearance:    Alert, cooperative, in no acute distress                  Eyes: conjunctiva clear  ENT: external ears and nose atraumatic  CV: no peripheral edema  Resp: normal respiratory effort  Skin: no rashes or wounds; normal turgor  Psych: mood and affect appropriate  Lymph: no nodes appreciated  Neuro: gross sensation intact  Vascular:  Palpable peripheral pulse in noted extremity  Musculoskeletal Extremities: skin warm dry and intact able to perform SLR without difficulty, calf soft and nttp ambulates without difficulty.    Radiology:   Reviewed from [facility] 8-5-20 well approximated patella fracture in good alignment    Assessment:     ICD-10-CM ICD-9-CM   1. Closed nondisplaced longitudinal fracture of left patella with routine healing, subsequent encounter S82.025D V54.16        Procedures   RETURN TO WORK WITH NO RESTRICTIONS    Plan: Biomechanics of pertinent body area discussed.  Risks, benefits, alternatives, comparisons, and complications of accepted medicines, " injections, recommendations, surgical procedures, and therapies explained and education provided in laymen's terms. Natural history and expected course of this patient's diagnosis discussed along with evaluation of therapies. Questions answered. When appropriate I also discussed proper use of cane, walker, trekking poles.   MEDICATIONS:  Prescription, OTC and Monitoring of Medications per orders to address ortho complaints; Evaluation and discussion of safety, precautions, side effects, and warnings given especially of long term NSAID or steroid therapy.    RICE: Rest, ice, compression, and elevation therapy, Cryotherapy/brachy therapy, and or OTC linaments as indicated with instructions.       8/13/2020    Much of this encounter note is an electronic transcription/translation of spoken language to printed text. The electronic translation of spoken language may permit erroneous, or at times, nonsensical words or phrases to be inadvertently transcribed; Although I have reviewed the note for such errors, some may still exist

## 2021-07-04 NOTE — TELEPHONE ENCOUNTER
Called patient and notified of xray findings  
PATIENT'S WIFE CALLING BACK TO SEE IF YOU HAD A CHANCE TO LOOK AT THE ADDITIONAL XRAY VIEW THAT THEY WENT AND HAD DOWNSTAIRS ON Friday. THEY CAN BE REACHED -[phone]./AW  
given to family